# Patient Record
Sex: FEMALE | Race: WHITE | NOT HISPANIC OR LATINO
[De-identification: names, ages, dates, MRNs, and addresses within clinical notes are randomized per-mention and may not be internally consistent; named-entity substitution may affect disease eponyms.]

---

## 2022-01-01 ENCOUNTER — RX ONLY (OUTPATIENT)
Age: 32
Setting detail: RX ONLY
End: 2022-01-01

## 2023-04-19 ENCOUNTER — APPOINTMENT (RX ONLY)
Dept: URBAN - METROPOLITAN AREA CLINIC 140 | Facility: CLINIC | Age: 33
Setting detail: DERMATOLOGY
End: 2023-04-19

## 2023-04-19 DIAGNOSIS — L60.3 NAIL DYSTROPHY: ICD-10-CM

## 2023-04-19 PROBLEM — L60.9 NAIL DISORDER, UNSPECIFIED: Status: ACTIVE | Noted: 2023-04-19

## 2023-04-19 PROCEDURE — ? COUNSELING

## 2023-04-19 PROCEDURE — ? NAIL CLIPPING FOR PATHOLOGY

## 2023-04-19 PROCEDURE — 99203 OFFICE O/P NEW LOW 30 MIN: CPT

## 2023-04-19 PROCEDURE — ? ADDITIONAL NOTES

## 2023-04-19 ASSESSMENT — LOCATION SIMPLE DESCRIPTION DERM
LOCATION SIMPLE: RIGHT SMALL FINGERNAIL
LOCATION SIMPLE: LEFT THUMBNAIL
LOCATION SIMPLE: RIGHT THUMBNAIL
LOCATION SIMPLE: LEFT MIDDLE FINGERNAIL
LOCATION SIMPLE: RIGHT INDEX FINGERNAIL
LOCATION SIMPLE: LEFT SMALL FINGERNAIL
LOCATION SIMPLE: LEFT RING FINGERNAIL
LOCATION SIMPLE: RIGHT RING FINGERNAIL
LOCATION SIMPLE: LEFT INDEX FINGERNAIL
LOCATION SIMPLE: RIGHT MIDDLE FINGERNAIL

## 2023-04-19 ASSESSMENT — LOCATION DETAILED DESCRIPTION DERM
LOCATION DETAILED: LEFT THUMBNAIL
LOCATION DETAILED: RIGHT INDEX FINGERNAIL
LOCATION DETAILED: LEFT MIDDLE FINGER LUNULA
LOCATION DETAILED: LEFT SMALL FINGERNAIL
LOCATION DETAILED: RIGHT RING FINGERNAIL
LOCATION DETAILED: RIGHT SMALL FINGERNAIL
LOCATION DETAILED: LEFT RING FINGERNAIL
LOCATION DETAILED: RIGHT MIDDLE FINGERNAIL
LOCATION DETAILED: RIGHT THUMBNAIL
LOCATION DETAILED: LEFT INDEX FINGER LUNULA

## 2023-04-19 ASSESSMENT — LOCATION ZONE DERM: LOCATION ZONE: FINGERNAIL

## 2023-04-19 NOTE — PROCEDURE: MIPS QUALITY
Quality 226: Preventive Care And Screening: Tobacco Use: Screening And Cessation Intervention: Patient screened for tobacco use and is an ex/non-smoker
Quality 137: Melanoma: Continuity Of Care - Recall System: Recall system not utilized, reason not otherwise specified
Quality 138: Melanoma: Coordination Of Care: Treatment plan not communicated, reason not otherwise specified.
Detail Level: Detailed

## 2023-04-19 NOTE — PROCEDURE: NAIL CLIPPING FOR PATHOLOGY
Detail Level: Detailed
Billing Type: Third-Party Bill
Add [id number] To Bill?: No
Lab: [id number]
Lab Facility: [id number]
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
